# Patient Record
Sex: FEMALE | Race: WHITE | NOT HISPANIC OR LATINO | Employment: STUDENT | URBAN - METROPOLITAN AREA
[De-identification: names, ages, dates, MRNs, and addresses within clinical notes are randomized per-mention and may not be internally consistent; named-entity substitution may affect disease eponyms.]

---

## 2023-10-26 ENCOUNTER — TELEPHONE (OUTPATIENT)
Dept: SURGERY | Facility: CLINIC | Age: 18
End: 2023-10-26
Payer: COMMERCIAL

## 2023-12-27 ENCOUNTER — OFFICE VISIT (OUTPATIENT)
Dept: PRIMARY CARE | Facility: CLINIC | Age: 18
End: 2023-12-27
Payer: COMMERCIAL

## 2023-12-27 VITALS
SYSTOLIC BLOOD PRESSURE: 106 MMHG | OXYGEN SATURATION: 100 % | WEIGHT: 135 LBS | HEIGHT: 63 IN | BODY MASS INDEX: 23.92 KG/M2 | DIASTOLIC BLOOD PRESSURE: 67 MMHG | HEART RATE: 97 BPM

## 2023-12-27 DIAGNOSIS — Z23 ENCOUNTER FOR IMMUNIZATION: ICD-10-CM

## 2023-12-27 DIAGNOSIS — N94.3 PMS (PREMENSTRUAL SYNDROME): Primary | ICD-10-CM

## 2023-12-27 PROBLEM — K58.9 IRRITABLE BOWEL SYNDROME: Status: ACTIVE | Noted: 2023-12-27

## 2023-12-27 PROBLEM — K44.9 HIATAL HERNIA: Status: ACTIVE | Noted: 2023-12-27

## 2023-12-27 PROBLEM — L70.9 ACNE: Status: ACTIVE | Noted: 2023-12-27

## 2023-12-27 PROBLEM — K21.9 ACID REFLUX: Status: ACTIVE | Noted: 2023-12-27

## 2023-12-27 PROCEDURE — 1036F TOBACCO NON-USER: CPT | Performed by: FAMILY MEDICINE

## 2023-12-27 PROCEDURE — 99213 OFFICE O/P EST LOW 20 MIN: CPT | Performed by: FAMILY MEDICINE

## 2023-12-27 PROCEDURE — 90471 IMMUNIZATION ADMIN: CPT | Performed by: FAMILY MEDICINE

## 2023-12-27 PROCEDURE — 90686 IIV4 VACC NO PRSV 0.5 ML IM: CPT | Performed by: FAMILY MEDICINE

## 2023-12-27 RX ORDER — NORGESTIMATE AND ETHINYL ESTRADIOL 0.25-0.035
1 KIT ORAL DAILY
Qty: 84 TABLET | Refills: 3 | Status: SHIPPED | OUTPATIENT
Start: 2023-12-27 | End: 2024-12-26

## 2023-12-27 RX ORDER — FLUTICASONE PROPIONATE 50 MCG
SPRAY, SUSPENSION (ML) NASAL
COMMUNITY
Start: 2021-07-30

## 2023-12-27 RX ORDER — ADAPALENE AND BENZOYL PEROXIDE GEL, 0.1%/2.5% 1; 25 MG/G; MG/G
GEL TOPICAL
COMMUNITY

## 2023-12-27 RX ORDER — OMEPRAZOLE 40 MG/1
40 CAPSULE, DELAYED RELEASE ORAL DAILY
COMMUNITY
End: 2024-03-27

## 2023-12-27 ASSESSMENT — ENCOUNTER SYMPTOMS
OCCASIONAL FEELINGS OF UNSTEADINESS: 0
LOSS OF SENSATION IN FEET: 0
DEPRESSION: 0

## 2023-12-27 ASSESSMENT — PATIENT HEALTH QUESTIONNAIRE - PHQ9
1. LITTLE INTEREST OR PLEASURE IN DOING THINGS: NOT AT ALL
SUM OF ALL RESPONSES TO PHQ9 QUESTIONS 1 AND 2: 0
2. FEELING DOWN, DEPRESSED OR HOPELESS: NOT AT ALL

## 2023-12-27 NOTE — PROGRESS NOTES
"Subjective   Patient ID: Sangeeta Padron is a 18 y.o. female who presents for Menstrual Problem (Cramps and Mental Problems).    HPI   The patient reports that her mood is frequently affected by her menstrual cycle and it is concerning to her. She has been on oral contraceptive pills in the past and noticed that once she stopped taking it her PMS symptoms worsened.    Review of Systems  Constitutional: No fever or chills  Cardiovascular: no chest pain, no palpitations and no syncope.   Respiratory: no cough, no shortness of breath during exertion and no shortness of breath at rest.   Gastrointestinal: no abdominal pain, no nausea and no vomiting.  Neuro: No Headache, no dizziness    Objective   /67   Pulse 97   Ht 1.6 m (5' 3\")   Wt 61.2 kg (135 lb)   SpO2 100%   BMI 23.91 kg/m²     Physical Exam  Constitutional: Alert and in no acute distress. Well developed, well nourished  Head and Face: Head and face: Normal.    Cardiovascular: Heart rate and rhythm were normal, normal S1 and S2. No peripheral edema.   Pulmonary: No respiratory distress. Clear bilateral breath sounds.  Musculoskeletal: Gait and station: Normal. Muscle strength/tone: Normal.   Skin: Normal skin color and pigmentation, normal skin turgor, and no rash.    Psychiatric: Judgment and insight: Intact. Mood and affect: Normal.    Lab Results   Component Value Date    WBC 5.5 07/08/2022    HGB 13.0 07/08/2022    HCT 40.1 07/08/2022     07/08/2022    CHOL 146 07/08/2022    TRIG 63 07/08/2022    HDL 55.6 07/08/2022    ALT 9 07/08/2022    AST 20 07/08/2022     07/08/2022    K 4.2 07/08/2022     07/08/2022    CREATININE 0.72 07/08/2022    BUN 9 07/08/2022    CO2 24 07/08/2022    TSH 1.62 07/30/2021       EGD  Patient Name: Sangeeta Padron  Procedure Date: 10/14/2022 11:04 AM  MRN: [id number]  Account Number: [id number]  YOB: 2005  Admit Type: Outpatient  Site: 10  Ethnicity: Not  or   Race: " White  Attending MD: Angelika Kelley MD, [id number]  Referring MD:            Procedure:             Upper GI endoscopy  Indications:           Heartburn  Providers:             Angelika Kelley MD (Doctor)  Medicines:             Monitored Anesthesia Care  Complications:         No immediate complications. Estimated blood loss:                          Minimal.  Procedure:             Pre-Anesthesia Assessment:                         - Prior to the procedure, a History and Physical was                          performed, and patient medications and allergies were                          reviewed. The patient is competent. The risks and                          benefits of the procedure and the sedation options and                          risks were discussed with the patient. All questions                          were answered and informed consent was obtained.                          Patient identification and proposed procedure were                          verified by the physician and the nurse in the                          procedure room. Mental Status Examination: alert and                          oriented. Airway Examination: normal oropharyngeal                          airway and neck mobility. Respiratory Examination:                          clear to auscultation. CV Examination: normal.                          Prophylactic Antibiotics: The patient does not require                          prophylactic antibiotics. Prior Anticoagulants: The                          patient has taken no anticoagulant or antiplatelet                          agents. ASA Grade Assessment: II - A patient with mild                          systemic disease. After reviewing the risks and                          benefits, the patient was deemed in satisfactory                          condition to undergo the procedure. The anesthesia                          plan was to use monitored anesthesia care (MAC).                           Immediately prior to administration of medications,                          the patient was re-assessed for adequacy to receive                          sedatives. The heart rate, respiratory rate, oxygen                          saturations, blood pressure, adequacy of pulmonary                          ventilation, and response to care were monitored                          throughout the procedure. The physical status of the                          patient was re-assessed after the procedure.                         After obtaining informed consent, and time out was                          performed, the endoscope was passed under direct                          vision. Throughout the procedure, the patient's blood                          pressure, pulse, and oxygen saturations were monitored                          continuously. The Diagnostic Upper Endoscope was                          introduced through the mouth, and advanced to the                          third part of duodenum. The upper GI endoscopy was                          accomplished without difficulty. The patient tolerated                          the procedure well.  Moderate Sedation:       An independent trained observer was present and continuously monitored        the patient.  Estimated Blood Loss:       Estimated blood loss was minimal.  Findings:       The Z-line was regular and was found 38 cm from the incisors.       The gastroesophageal flap valve was visualized endoscopically and        classified as Hill Grade II (fold present, opens with respiration).        Oatulous and blows open easily       The entire examined stomach was normal.       The examined duodenum was normal.       The BRAVO capsule with delivery system was introduced through the mouth        and advanced into the esophagus, such that the BRAVO pH capsule was        positioned 32 cm from the incisors, which was 6 cm proximal to the GE         junction. Suction was applied to the well of the BRAVO pH capsule to        suck in the adjacent mucosa of the esophagus using the external vacuum        pump set at a minimum vacuum pressure of 550 mmHg for 45 seconds. The        BRAVO pH capsule was then deployed by depressing the plunger on top of        the handle to advance the locking pin into the mucosa, thereby attaching        the capsule to the esophagus. The plunger was then rotated a quarter        turn clockwise to release the capsule from the delivery system. The        delivery system was then withdrawn. Endoscopy was utilized for probe        placement and diagnostic evaluation. The scope was reinserted to        evaluate placement of the BRAVO capsule. Visualization showed the BRAVO        capsule to be in an appropriate position.  Impression:            - Z-line regular, 38 cm from the incisors.                         - Gastroesophageal flap valve classified as Hill Grade                          II (fold present, opens with respiration).                         - Normal stomach.                         - Normal examined duodenum.                         - The BRAVO pH capsule was positioned 32 cm from the                          incisors, which was 6 cm proximal to the GE junction.                         - No specimens collected.  Recommendation:        - Discharge patient to home (ambulatory).                         - Resume previous diet indefinitely.                         - Continue present medications after the bravo box is                          turned in                         - Return to my office after studies are complete.  Procedure Code(s):     --- Professional ---                         07750, Esophagogastroduodenoscopy, flexible,                          transoral; diagnostic, including collection of                          specimen(s) by brushing or washing, when performed                          (separate  procedure)  Diagnosis Code(s):     --- Professional ---                         R12, Heartburn  CPT copyright 2021 American Medical Association. All rights reserved.  The codes documented in this report are preliminary and upon  review may   be revised to meet current compliance requirements.  MD Angelika Teague MD  10/14/2022 11:53:19 AM  This report has been signed electronically.  Number of Addenda: 0      Assessment/Plan   Diagnoses and all orders for this visit:  PMS (premenstrual syndrome)  -     norgestimate-ethinyl estradioL (Sprintec, 28,) 0.25-35 mg-mcg tablet; Take 1 tablet by mouth once daily.  Encounter for immunization  -     Flu vaccine (IIV4) age 6 months and greater, preservative free        Dear Sangeeta Padron     It was my pleasure to take care of you today in the office. Below are the things we discussed today:    1. PMS: Start Sprintec. Advised the patient that if she has any issues with this medication please let me know.    2. Flu shot: Given today.    Your yearly Physical is due in: July 2024   When you call the office for your yearly Physical, please ask them to inform me to order your blood work, so that you can get the fasting blood work before your appointment and we can discuss the results at your physical.      Please call me if any questions arise from now until your next visit. I will call you after I am done seeing patients. A Doctor is always available by phone when the office is closed. Please feel free to call for help with any problem that you feel shouldn't wait until the office re-opens.     Scribe Attestation  By signing my name below, IAlice Scribe   attest that this documentation has been prepared under the direction and in the presence of Toya Ryan MD.

## 2024-01-30 ENCOUNTER — TELEPHONE (OUTPATIENT)
Dept: SURGERY | Facility: CLINIC | Age: 19
End: 2024-01-30
Payer: COMMERCIAL

## 2024-01-30 NOTE — PROGRESS NOTES
GENERAL SURGERY CLINIC  FOLLOW UP NOTE    CLINIC DATE: 2/5/24    NAME:  Sangeeta Padron 18 y.o.  MRN: [id number]    THIS VISIT WEIGHT / BMI:   Wt Readings from Last 1 Encounters:   12/27/23 61.2 kg (135 lb)      BMI Readings from Last 1 Encounters:   12/27/23 23.91 kg/m²     WEIGHT / BMI HX:    Wt Readings from Last 3 Encounters:   12/27/23 61.2 kg (135 lb)   09/12/22 55.8 kg (123 lb) (42 %, Z= -0.20)*   07/06/22 55.3 kg (122 lb) (41 %, Z= -0.23)*     * Growth percentiles are based on CDC (Girls, 2-20 Years) data.       BMI Readings from Last 3 Encounters:   12/27/23 23.91 kg/m²   09/12/22 21.79 kg/m² (52 %, Z= 0.06)*   07/06/22 21.27 kg/m² (46 %, Z= -0.09)*     * Growth percentiles are based on CDC (Girls, 2-20 Years) data.       SURGEON:  Dr. 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 . She has had workup with some tests at [employer] and some at . Endoscopy shows normal anatomy. Esophagram shows abnormal motility, manometry demonstrates a sliding hiatal hernia with a hypotensive LES and poor esophageal clearance. She is here with her parents today. She does have pathologic reflux on the bravo with reflux 5/3%. She has a small sliding hiatal hernia. We discussed she would be a great candidate for TIF and geve a 70% success rate. We discussed with surgery can address the HH and a fundoplication to strengthen the hypotensive valve. she notes her symptoms are manageable for the moment. We discussed the poor symtpoms association on the bravo and that some symtpoms are related to reflux and some may be related to her menses. Now 6 months later she notes the following:cough about the same. Notes more hiccups when she eat. Abdominal pain less often and now notes the menses seem like a separate thing. We will continue to monitor and see her in 6 months.       Chew well  Eat slowly  Take small  bites  We can refill the omeprazole  Don';t overeat  Follow up in 6 months  Avoid chocolate, peppermint and caffeine.     egd by Sergio: normal anatomy, no HH  EMOT 12/21 shows poor clearance with bolus transit 50%, hypotensive LES and a sliding hiatal herniw  esophagram at [employer] shows abnormal motility with stripping wave  MBS-normal     Dr López Note May 2022  Ms. Padron is a 16-year-old female with a hiatal hernia and reflux disease with main symptom of coughing after consuming liquids. This raises concern for possible silent aspiration which could be a long-term problem for her over the years. I reviewed the studies submitted by [facility] and also the recent esophageal manometry. An argument could be made for surgical correction of her hiatal hernia. We discussed technique of laparoscopic or robotic assisted hiatal hernia repair with different kinds of fundoplication. Risks of surgery were also reviewed. Specific risks including bleeding, infection, recurrence, dysphagia etc. were addressed. Patient and her mother are leaning toward surgery. All other questions were answered. I will reach out to [facility] to see if they have plans to perform an upper endoscopy prior to surgery.     Carlos note Novemebr 2021  Esophageal dysmotility- you have not seen a difference with the famotidine and have stopped this. I would recommend having a esophageal manometry to assess for a disorder called achalasia.     In the interim please continue to cut your food up really well, chew well, drink liquid between each bits    I will call you with the results and determine follow up     Carlos Note July 2021  Increased reflux and laryngeal dysphagia- I will order an swallowing evaluation with speech therapy and an esophagram to check the esophagus. We will complete a breath test for H-pylori. You can use the famotidine 20 mg twice daily.    Abdominal cramping and loose stools- your symptoms are more consistent with Irritable  bowel syndrome. I will order labs for celiac and thyroid to rule out other issues.    I will call you with the results and determine follow up       PRESENTING TODAY for General Surgery [facility]UV (no surgery to date):   is a 18 y.o. female.  Self verbalizes concerns: Stomach symptoms increasing even while on medications, throat is now hurting at night.      CURRENT SYMPTOMS:      Abdominal pain:  Yes          Abdominal bloating:  Yes         Burping:  No          Constipation: Yes    Diarrhea: Yes    Experiencing hunger: No    Food Intolerances: No    Food Sticking:  No    Gassy:  Yes    Hiccups: Yes          Hx Gastric Ulcers:  No         Nausea/vomiting: No    Symptoms Affect Ability to Excercise:No             DIET HISTORY:       Carbonated Beverages: Yes          Caffeinated Beverages: Yes    Fluid intake: feels hydrated oz/day         GERD - Health Related Quality of Life Questionnaire (GERD- HRQL)  On PPI    How bad is the heartburn? 0 = No symptoms  Heartburn when lying down? 0 = No symptoms  Heartburn when standing up? 0 = No symptoms  Heartburn after meals? 0 = No symptoms  Does heartburn change your diet? 0 = No symptoms  Does heartburn wake you from sleep? 0 = No symptoms    Do you have difficulty swallowing? 0 = No symptoms  Do you have pain with swallowing? 0 = No symptoms  If you take medication, does this affect your daily life? 1 = Symptoms noticeable but not bothersome    How bad is the regurgitation? 0 = No symptoms  Regurgitation when lying down? 0 = No symptoms  Regurgitation when standing up? 0 = No symptoms  Regurgitation after meals? 0 = No symptoms  Does regurgitation change your diet? 0 = No symptoms  Does regurgitation wake you from sleep? 0 = No symptoms    How satisfied are you with your present condition? Satisfied    Total score (calculated by summing the individual scores of questions 1-15): 0   Greatest possible score 75 (worst symptoms).   Lowest possible score 0 (no  symptoms).    Heartburn score (calculated by summing the individual scores of questions 1-6): 0   Worst heartburn symptoms: 30.   No heartburn symptoms: 0.   Score less than or equal to 12 with each individual question not exceeding 2 indicate heartburn elimination.    Regurgitation score (calculated by summing the individual scores of questions 10-15): 0   Worst regurgitation symptoms: 30.   No regurgitation symptoms: 0.   Score less than or equal to 12 with each individual question not exceeding 2 indicate regurgitation elimination.     CURRENT MEDICATIONS:  Current Outpatient Medications   Medication Sig Dispense Refill    adapalene-benzoyl peroxide 0.1-2.5 % gel APPLY THIN LAYER TO FACE NIGHTLY      fluticasone (Flonase Allergy Relief) 50 mcg/actuation nasal spray Administer into affected nostril(s).      norgestimate-ethinyl estradioL (Sprintec, 28,) 0.25-35 mg-mcg tablet Take 1 tablet by mouth once daily. 84 tablet 3    omeprazole (PriLOSEC) 40 mg DR capsule Take 1 capsule (40 mg) by mouth once daily.       No current facility-administered medications for this visit.       PAST MEDICAL HISTORY:  Patient Active Problem List   Diagnosis    Acid reflux    Hiatal hernia    Acne    Irritable bowel syndrome       PAST SURGICAL HISTORY:  Past Surgical History:   Procedure Laterality Date    OTHER SURGICAL HISTORY  08/20/2018    Extraction Impacted Root       FAMILY HISTORY:  No family history on file.    SOCIAL HISTORY:  Social History     Socioeconomic History    Marital status: Single     Spouse name: Not on file    Number of children: Not on file    Years of education: Not on file    Highest education level: Not on file   Occupational History    Not on file   Tobacco Use    Smoking status: Never    Smokeless tobacco: Never   Substance and Sexual Activity    Alcohol use: Not on file    Drug use: Not on file    Sexual activity: Not on file   Other Topics Concern    Not on file   Social History Narrative    Not on  file     Social Determinants of Health     Financial Resource Strain: Not on file   Food Insecurity: Not on file   Transportation Needs: Not on file   Physical Activity: Not on file   Stress: Not on file   Social Connections: Not on file   Intimate Partner Violence: Not on file   Housing Stability: Not on file       ALLERGIES:  No Known Allergies    REVIEW OF SYSTEMS:  CONSTITUTIONAL: Patient denies fevers, chills, sweats and weight changes.  EYES: Patient denies any visual symptoms.  EARS, NOSE, AND THROAT: No difficulties with hearing. No symptoms of rhinitis or sore throat.  CARDIOVASCULAR: Patient denies chest pains, palpitations, orthopnea and paroxysmal nocturnal dyspnea.  RESPIRATORY: No dyspnea on exertion, no wheezing or cough.  GI: No nausea, vomiting, diarrhea, constipation, abdominal pain, hematochezia or melena.  : No urinary hesitancy or dribbling. No nocturia or urinary frequency. No abnormal urethral discharge.  MUSCULOSKELETAL: No myalgias or arthralgias.  NEUROLOGIC: No chronic headaches, no seizures. Patient denies numbness, tingling or weakness.  PSYCHIATRIC: Patient denies problems with mood disturbance. No problems with anxiety.  ENDOCRINE: No excessive urination or excessive thirst.  DERMATOLOGIC: Patient denies any rashes or skin changes.    PHYSICAL EXAM:  There were no vitals taken for this visit.  PHYSICAL EXAMINATION:  GENERAL: No apparent distress. Pt is alert and oriented x3.  VITAL SIGNS: HR, BP, Temp; Normal  HEENT: Head is normocephalic and atraumatic. Extraocular muscles are intact. Pupils are equal, round, and reactive to light and accommodation. Nares appeared normal. Mouth is well hydrated and without lesions. Mucous membranes are moist. Posterior pharynx clear of any exudate or lesions.  NECK: Supple. No carotid bruits. No lymphadenopathy or thyromegaly.  LUNGS: Clear to auscultation.  HEART: Regular rate and rhythm without murmur.  ABDOMEN: Soft, appropriately mildly tender  to palpation, and nondistended. Positive bowel sounds. No hepatosplenomegaly was noted. Incisions CDI.  EXTREMITIES: Without any cyanosis, clubbing, rash, lesions or edema.  NEUROLOGIC: Cranial nerves II through XII are grossly intact.  PSYCHIATRIC: Flat affect, but denies suicidal or homicidal ideations.  SKIN: No ulceration or induration present.      IMPRESSION:   Sangeeta Padron IS A 18 y.o. female with ***    PLAN:  ***    The risks of the procedure including bleeding, infection, injury to neighboring organ, prolonged hospitalization, need for further procedure and death have been explained to the patient and Sangeeta Padron has expressed understanding and acceptance of them. Consent has been signed.    *** minutes spent with patient on face-to-face interaction, history/documentation, education, and coordination of care.    Dr. Angelika Kelley M.D., MPH  Director of Bariatric & Minimally Invasive Surgery  [place] [address]  T:  [phone]  F:  [phone]     Nila Hopper, RN Assistant Nurse Manager, Care Coordinator - Bariatric/General Surgery [facility] Patient Nursing Contact  T:  [phone]  F:  [phone]

## 2024-01-30 NOTE — TELEPHONE ENCOUNTER
Thank you for speaking with me earlier today & confirming your scheduled visit with Dr. Kelley on 2/5/24 at 12:45 PM at [facility] (inside [facility], main floor in the [facility]).  We look forward to seeing you, Brandy Reyna, [facility] Clinic Nurse.

## 2024-02-05 ENCOUNTER — APPOINTMENT (OUTPATIENT)
Dept: SURGERY | Facility: CLINIC | Age: 19
End: 2024-02-05
Payer: COMMERCIAL

## 2024-03-11 ENCOUNTER — APPOINTMENT (OUTPATIENT)
Dept: SURGERY | Facility: CLINIC | Age: 19
End: 2024-03-11
Payer: COMMERCIAL

## 2024-03-13 ENCOUNTER — APPOINTMENT (OUTPATIENT)
Dept: GASTROENTEROLOGY | Facility: CLINIC | Age: 19
End: 2024-03-13
Payer: COMMERCIAL

## 2024-03-21 ENCOUNTER — TELEPHONE (OUTPATIENT)
Dept: SURGERY | Facility: CLINIC | Age: 19
End: 2024-03-21
Payer: COMMERCIAL

## 2024-03-21 NOTE — PROGRESS NOTES
GENERAL SURGERY CLINIC  FOLLOW UP NOTE    CLINIC DATE: 4/1/24    NAME:  Sangeeta Padron 18 y.o.  MRN: [id number]    THIS VISIT WEIGHT / BMI: 140 LBS/24.80  Wt Readings from Last 1 Encounters:   12/27/23 61.2 kg (135 lb)      BMI Readings from Last 1 Encounters:   12/27/23 23.91 kg/m²     WEIGHT / BMI HX:    Wt Readings from Last 3 Encounters:   12/27/23 61.2 kg (135 lb)   09/12/22 55.8 kg (123 lb) (42 %, Z= -0.20)*   07/06/22 55.3 kg (122 lb) (41 %, Z= -0.23)*     * Growth percentiles are based on CDC (Girls, 2-20 Years) data.       BMI Readings from Last 3 Encounters:   12/27/23 23.91 kg/m²   09/12/22 21.79 kg/m² (52 %, Z= 0.06)*   07/06/22 21.27 kg/m² (46 %, Z= -0.09)*     * Growth percentiles are based on CDC (Girls, 2-20 Years) data.       SURGEON:  Dr. Angelika Kelley      PROVIDER LAST IMPRESSION VISIT NOTE:  5/31/23 -     Patient Discussion/Summary  Chew well  Eat slowly  Take small bites  We can refill the omeprazole  Don';t overeat  Follow up in 6 months  Avoid chocolate, peppermint and caffeine.      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 . She has had workup with some tests at [employer] and some at . Endoscopy shows normal anatomy. Esophagram shows abnormal motility, manometry demonstrates a sliding hiatal hernia with a hypotensive LES and poor esophageal clearance. She is here with her parents today. She does have pathologic reflux on the bravo with reflux 5/3%. She has a small sliding hiatal hernia. We discussed she would be a great candidate for TIF and geve a 70% success rate. We discussed with surgery can address the HH and a fundoplication to strengthen the hypotensive valve. she notes her symptoms are manageable for the moment. We discussed the poor symtpoms association on the bravo and that some symtpoms are related to reflux and some may be related to her menses. Now 6 months later she notes the following:cough  about the same. Notes more hiccups when she eat. Abdominal pain less often and now notes the menses seem like a separate thing. We will continue to monitor and see her in 6 months.       PRESENTING TODAY for General Surgery [facility]UV (no surgery to date):   is a 18 y.o. female.  Self verbalizes concerns: Taking medications per last visit but symptoms are returning, Dentist recently told her back teeth show signs of erosion, Coughing mainly after drinking fluids.  Notes symtpms worsening.   She is using prescription toothpaste.  Fluoride supplemented.  She no longer thinks related to her cycle. On medicine still has symptoms, taking 40 q day. Symtpms are stomach pain, nausea, coughing when drinking and sore thorat.  Pain is more in lower abdomen.  Sometimes better with a bm, other times not. No gasx.  Feels not as much constipation.  Has more diarrhea.  Just kind of an achy pain.  Bmi's once/day and loose.  Can't tell if diet affects her bm  Yesterday had peas, potato soup, pastry, chocolate, did not feel to bad yesterday.  Symtpms not affecting her ability.  Notes after she eats, she gets the stomach pain about 10 min after eating. Sometimes diarrhea.  Feels more about the amount she eats. Mostly vegetarian.   CURRENT SYMPTOMS:      Abdominal pain:  Yes          Abdominal bloating:  No         Burping:  No          Constipation: Yes    Diarrhea: Yes    Experiencing hunger: No    Food Intolerances: No    Gassy:  Yes    Hiccups: Yes          Hx Gastric Ulcers:  No         Nausea/vomiting: No    Symptoms Affect Ability to Excercise:No         DIET HISTORY:       Carbonated Beverages: Yes          Caffeinated Beverages: Yes    Fluid intake: feels hydrated oz/day         GERD - Health Related Quality of Life Questionnaire (GERD- HRQL)  On PPI    How bad is the heartburn? 0 = No symptoms  Heartburn when lying down? 0 = No symptoms  Heartburn when standing up? 0 = No symptoms  Heartburn after meals? 0 = No symptoms  Does  heartburn change your diet? 0 = No symptoms  Does heartburn wake you from sleep? 0 = No symptoms    Do you have difficulty swallowing? 2 = Symptoms noticeable and bothersome but not every day  Do you have pain with swallowing? 0 = No symptoms  If you take medication, does this affect your daily life? 1 = Symptoms noticeable but not bothersome    How bad is the regurgitation? 0 = No symptoms  Regurgitation when lying down? 0 = No symptoms  Regurgitation when standing up? 0 = No symptoms  Regurgitation after meals? 0 = No symptoms  Does regurgitation change your diet? 0 = No symptoms  Does regurgitation wake you from sleep? 0 = No symptoms    How satisfied are you with your present condition? Neutral    Total score (calculated by summing the individual scores of questions 1-15): 3   Greatest possible score 75 (worst symptoms).   Lowest possible score 0 (no symptoms).    Heartburn score (calculated by summing the individual scores of questions 1-6): 0   Worst heartburn symptoms: 30.   No heartburn symptoms: 0.   Score less than or equal to 12 with each individual question not exceeding 2 indicate heartburn elimination.    Regurgitation score (calculated by summing the individual scores of questions 10-15): 0   Worst regurgitation symptoms: 30.   No regurgitation symptoms: 0.   Score less than or equal to 12 with each individual question not exceeding 2 indicate regurgitation elimination.     CURRENT MEDICATIONS:  Current Outpatient Medications   Medication Sig Dispense Refill    adapalene-benzoyl peroxide 0.1-2.5 % gel APPLY THIN LAYER TO FACE NIGHTLY      fluticasone (Flonase Allergy Relief) 50 mcg/actuation nasal spray Administer into affected nostril(s).      norgestimate-ethinyl estradioL (Sprintec, 28,) 0.25-35 mg-mcg tablet Take 1 tablet by mouth once daily. 84 tablet 3    omeprazole (PriLOSEC) 40 mg DR capsule Take 1 capsule (40 mg) by mouth once daily.       No current facility-administered medications for this  visit.       PAST MEDICAL HISTORY:  Patient Active Problem List   Diagnosis    Acid reflux    Hiatal hernia    Acne    Irritable bowel syndrome       PAST SURGICAL HISTORY:  Past Surgical History:   Procedure Laterality Date    OTHER SURGICAL HISTORY  08/20/2018    Extraction Impacted Root       FAMILY HISTORY:  No family history on file.    SOCIAL HISTORY:  Social History     Socioeconomic History    Marital status: Single     Spouse name: Not on file    Number of children: Not on file    Years of education: Not on file    Highest education level: Not on file   Occupational History    Not on file   Tobacco Use    Smoking status: Never    Smokeless tobacco: Never   Substance and Sexual Activity    Alcohol use: Not on file    Drug use: Not on file    Sexual activity: Not on file   Other Topics Concern    Not on file   Social History Narrative    Not on file     Social Determinants of Health     Financial Resource Strain: Not on file   Food Insecurity: Not on file   Transportation Needs: Not on file   Physical Activity: Not on file   Stress: Not on file   Social Connections: Not on file   Intimate Partner Violence: Not on file   Housing Stability: Not on file       ALLERGIES:  No Known Allergies    REVIEW OF SYSTEMS:  CONSTITUTIONAL: Patient denies fevers, chills, sweats and weight changes.  EYES: Patient denies any visual symptoms.  EARS, NOSE, AND THROAT: No difficulties with hearing. No symptoms of rhinitis or sore throat.  CARDIOVASCULAR: Patient denies chest pains, palpitations, orthopnea and paroxysmal nocturnal dyspnea.  RESPIRATORY: No dyspnea on exertion, no wheezing or cough.  GI: No nausea, vomiting, diarrhea, constipation, abdominal pain, hematochezia or melena.  : No urinary hesitancy or dribbling. No nocturia or urinary frequency. No abnormal urethral discharge.  MUSCULOSKELETAL: No myalgias or arthralgias.  NEUROLOGIC: No chronic headaches, no seizures. Patient denies numbness, tingling or  weakness.  PSYCHIATRIC: Patient denies problems with mood disturbance. No problems with anxiety.  ENDOCRINE: No excessive urination or excessive thirst.  DERMATOLOGIC: Patient denies any rashes or skin changes.    PHYSICAL EXAM:  There were no vitals taken for this visit.  PHYSICAL EXAMINATION:  GENERAL: No apparent distress. Pt is alert and oriented x3.  VITAL SIGNS: HR, BP, Temp; Normal  HEENT: Head is normocephalic and atraumatic. Extraocular muscles are intact. Pupils are equal, round, and reactive to light and accommodation. Nares appeared normal. Mouth is well hydrated and without lesions. Mucous membranes are moist. Posterior pharynx clear of any exudate or lesions.  NECK: Supple. No carotid bruits. No lymphadenopathy or thyromegaly.  LUNGS: Clear to auscultation.  HEART: Regular rate and rhythm without murmur.  ABDOMEN: Soft, appropriately mildly tender to palpation, and nondistended. Positive bowel sounds. No hepatosplenomegaly was noted. Incisions CDI.  EXTREMITIES: Without any cyanosis, clubbing, rash, lesions or edema.  NEUROLOGIC: Cranial nerves II through XII are grossly intact.  PSYCHIATRIC: Flat affect, but denies suicidal or homicidal ideations.  SKIN: No ulceration or induration present.      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 . She has had workup with some tests at [employer] and some at . Endoscopy shows normal anatomy. Esophagram shows abnormal motility, manometry demonstrates a sliding hiatal hernia with a hypotensive LES and poor esophageal clearance. She is here with her parents today. She does have pathologic reflux on the bravo with reflux 5/3%. She has a small sliding hiatal hernia. We discussed she would be a great candidate for TIF and geve a 70% success rate. We discussed with surgery can address the HH and a fundoplication to strengthen the hypotensive valve. she notes her symptoms are  manageable for the moment. We discussed the poor symtpoms association on the bravo and that some symtpoms are related to reflux and some may be related to her menses. Now 6 months later she notes the following:cough about the same. Notes more hiccups when she eat. Abdominal pain less often and now notes the menses seem like a separate thing. We will continue to monitor and see her in 6 months.     Today she confirms that she still has some pain and some heartburn.  However she does confirm that they are not related to her menstrual cycle.  The PPIs do reasonably control her reflux but she does have significant breakthrough symptoms.  Is interesting is that she did talk more about some lower abdominal pain and her diarrhea today.  It is not necessarily related to food.  But this seems to be separate from recurrent heartburn.  Plan for mom to keep a food diary and see if she can associate some of the lower abdominal pain and the diarrhea with different types of foods.  As we discussed that if we wanted to do an antireflux procedure by deciliter abdominal pain and diarrhea it is unlikely to be affected by that.  Will also evaluate her gallbladder with right upper quadrant ultrasound as they can be a cause of some of her lower abdominal symptoms.  We will see her back in 4 months time at which point we will reevaluate.  At that point we will likely discuss intervention for her reflux and hopefully have a better understanding of her diarrhea.  The patient and her mother are comfortable with this plan    PLAN:  Keep a food diary  Monitor to see if artifical sweeteners, gluten or fatty foods affect your diarrhea and lower abdominal pain  Get an ultrasound of the gallbladder  See me in 4 months  Continue te antiacid meds     for now.   Avoid trigger foods  Do not oevereat.         Dr. Angelika Kelley M.D., MPH  Director of Bariatric & Minimally Invasive Surgery  [address]  [address]  T:  [phone]  F:  [phone]     Nila Hopper, RN Assistant Nurse Manager, Care Coordinator - Bariatric/General Surgery [facility] Patient Nursing Contact  T:  [phone]  F:  [phone]

## 2024-03-26 ENCOUNTER — TELEPHONE (OUTPATIENT)
Dept: SURGERY | Facility: CLINIC | Age: 19
End: 2024-03-26
Payer: COMMERCIAL

## 2024-03-26 NOTE — TELEPHONE ENCOUNTER
Xu Rutherford, just left a second message to confirm your scheduled visit with Dr. Kelley on 4/1/24 at [facility] (inside [facility], main floor in the Specialty Clinic) & to complete the usual pre-visit quetions.   Parking is available at a cost of $5.00 at the Main Entrance.   Please return my call at:  [phone] & We look forward to seeing you, Brandy Reyna, MEGAN Clinic Nurse.

## 2024-03-26 NOTE — TELEPHONE ENCOUNTER
Thank you for speaking with me earlier today & confirming your scheduled visit with Dr. Kelley on 4/1/24 8:15 AM at [facility] (inside [facility], main floor in the Specialty Clinic).   Parking is available at a cost of $5.00 at the Main Entrance.   We look forward to seeing you, Brandy Reyna, LPMALIKA Clinic Nurse.

## 2024-03-27 DIAGNOSIS — K21.9 GASTROESOPHAGEAL REFLUX DISEASE WITHOUT ESOPHAGITIS: Primary | ICD-10-CM

## 2024-03-27 RX ORDER — OMEPRAZOLE 40 MG/1
40 CAPSULE, DELAYED RELEASE ORAL DAILY
Qty: 90 CAPSULE | Refills: 2 | Status: SHIPPED | OUTPATIENT
Start: 2024-03-27

## 2024-04-01 ENCOUNTER — OFFICE VISIT (OUTPATIENT)
Dept: SURGERY | Facility: CLINIC | Age: 19
End: 2024-04-01
Payer: COMMERCIAL

## 2024-04-01 VITALS
SYSTOLIC BLOOD PRESSURE: 110 MMHG | BODY MASS INDEX: 24.8 KG/M2 | DIASTOLIC BLOOD PRESSURE: 71 MMHG | HEART RATE: 93 BPM | WEIGHT: 140 LBS

## 2024-04-01 DIAGNOSIS — R10.11 RIGHT UPPER QUADRANT ABDOMINAL PAIN: ICD-10-CM

## 2024-04-01 DIAGNOSIS — K21.9 GASTROESOPHAGEAL REFLUX DISEASE WITHOUT ESOPHAGITIS: Primary | ICD-10-CM

## 2024-04-01 PROCEDURE — 99214 OFFICE O/P EST MOD 30 MIN: CPT | Performed by: SURGERY

## 2024-04-01 RX ORDER — SODIUM FLUORIDE 6 MG/ML
PASTE, DENTIFRICE DENTAL DAILY
COMMUNITY

## 2024-04-03 ENCOUNTER — TELEPHONE (OUTPATIENT)
Dept: SURGERY | Facility: CLINIC | Age: 19
End: 2024-04-03
Payer: COMMERCIAL

## 2024-04-03 NOTE — TELEPHONE ENCOUNTER
Xu Rutherford, the Right Upper Quadrant Ultra Sound for Gallbladder order now in system per Dr. Kelley visit - please call [phone] to self schedule at any  facility.   Once this has been completed please call [phone] to schedule a follow-up visit for test results review with Dr. Kelley.  Thanks!  Brandy Reyna LPN Clinic Nurse

## 2024-04-03 NOTE — PATIENT INSTRUCTIONS
Keep a food diary  Monitor to see if artifical sweeteners, gluten or fatty foods affect your diarrhea and lower abdominal pain  Get an ultrasound of the gallbladder  See me in 4 months  Continue te antiacid meds     for now.   Avoid trigger foods  Do not oevereat.         Dr. Angelika Kelley M.D., MPH  Director of Bariatric & Minimally Invasive Surgery  [place] [address]  T:  [phone]  F:  [phone]     Nila Hopper, RN Assistant Nurse Manager, Care Coordinator - Bariatric/General Surgery [facility] Patient Nursing Contact  T:  [phone]  F:  [phone]

## 2024-05-02 ENCOUNTER — HOSPITAL ENCOUNTER (OUTPATIENT)
Dept: RADIOLOGY | Facility: CLINIC | Age: 19
Discharge: HOME | End: 2024-05-02
Payer: COMMERCIAL

## 2024-05-02 DIAGNOSIS — R10.11 RIGHT UPPER QUADRANT ABDOMINAL PAIN: ICD-10-CM

## 2024-05-02 PROCEDURE — 76705 ECHO EXAM OF ABDOMEN: CPT | Performed by: RADIOLOGY

## 2024-05-02 PROCEDURE — 76705 ECHO EXAM OF ABDOMEN: CPT

## 2024-05-20 ENCOUNTER — APPOINTMENT (OUTPATIENT)
Dept: SURGERY | Facility: CLINIC | Age: 19
End: 2024-05-20
Payer: COMMERCIAL

## 2024-05-28 ENCOUNTER — APPOINTMENT (OUTPATIENT)
Dept: SURGERY | Facility: CLINIC | Age: 19
End: 2024-05-28
Payer: COMMERCIAL

## 2024-06-04 ENCOUNTER — APPOINTMENT (OUTPATIENT)
Dept: SURGERY | Facility: CLINIC | Age: 19
End: 2024-06-04
Payer: COMMERCIAL

## 2024-07-08 ENCOUNTER — APPOINTMENT (OUTPATIENT)
Dept: SURGERY | Facility: CLINIC | Age: 19
End: 2024-07-08
Payer: COMMERCIAL

## 2024-07-10 ENCOUNTER — APPOINTMENT (OUTPATIENT)
Dept: SURGERY | Facility: CLINIC | Age: 19
End: 2024-07-10
Payer: COMMERCIAL

## 2024-07-16 NOTE — PROGRESS NOTES
Subjective   Patient ID: Sangeeta Padron is a 19 y.o. female who presents for Follow-up.    HPI   The patient states that her symptoms started 5 days ago with a sore throat and congestion. She her symptoms have improved over the past couple day, but not fully. She is on OCPs for acne vulgaris and is also taking omeprazole for GERD but she does not think her acid reflux is being controlled at this time.    Review of Systems:  Constitutional: No fever or chills  Cardiovascular: no chest pain, no palpitations and no syncope.   Respiratory: no cough, no shortness of breath during exertion and no shortness of breath at rest.   Gastrointestinal: no abdominal pain, no nausea and no vomiting.  Neuro: No Headache, no dizziness    Physical Exam:   Constitutional: Alert and in no acute distress. Well developed, well nourished.   Head and Face: Head and face: Normal.     Eyes: Normal external exam.    Ears, Nose, Mouth, and Throat: External inspection of ears and nose: Normal.  Hearing: Normal.   Neck: No neck mass was observed. Supple.  Pulmonary: No respiratory distress.    Musculoskeletal: Range of motion: Normal.     Skin: Normal skin color and pigmentation, normal skin turgor, and no rash.    Neurologic: Coordination: Normal.    Psychiatric: Judgment and insight: Intact. Mood and affect: Normal.    Lab Results   Component Value Date    WBC 5.5 07/08/2022    HGB 13.0 07/08/2022    HCT 40.1 07/08/2022     07/08/2022    CHOL 146 07/08/2022    TRIG 63 07/08/2022    HDL 55.6 07/08/2022    ALT 9 07/08/2022    AST 20 07/08/2022     07/08/2022    K 4.2 07/08/2022     07/08/2022    CREATININE 0.72 07/08/2022    BUN 9 07/08/2022    CO2 24 07/08/2022    TSH 1.62 07/30/2021       US gallbladder  Narrative: Interpreted By:  Mack Edwards,   STUDY:  US GALLBLADDER;  5/2/2024 9:19 am      INDICATION:  Signs/Symptoms:Rule out Gallstones as source of reported RUQ ABD  pain/diarrhea & After MD Exam..       COMPARISON:  None.      ACCESSION NUMBER(S):  [id number]      ORDERING CLINICIAN:  IVETH TEJADA      TECHNIQUE:  Multiple images of the right upper quadrant were obtained.      FINDINGS:  LIVER:  The liver measures 14.8 cm and is grossly unremarkable and free of  any focal lesions.          GALLBLADDER:  The gallbladder is nondistended, and demonstrates no evidence of  gallstones, wall thickening or surrounding fluid. The gallbladder  wall thickness is .16cm. Sonographic Alonso's sign is negative.          BILE DUCTS:  No evidence of intra or extrahepatic biliary dilatation is  identified; the common bile duct measures .16 cm.      PANCREAS:  The visualized pancreas is unremarkable in appearance.      RIGHT KIDNEY:  The right kidney measures 10.1 cm in length. The renal cortical  echogenicity and thickness are within normal limit.  No  hydronephrosis or renal calculi are seen.      Impression: Unremarkable ultrasound of the right upper quadrant.      MACRO:  None      Signed by: Mack Edwards 5/3/2024 6:56 AM  Dictation workstation:   [id number]      Assessment/Plan   Diagnoses and all orders for this visit:  Routine general medical examination at a health care facility  -     CBC; Future  -     Comprehensive Metabolic Panel; Future  -     Hemoglobin A1C; Future  -     Lipid Panel; Future  -     TSH with reflex to Free T4 if abnormal; Future  Vitamin D deficiency  -     Vitamin D 25-Hydroxy,Total (for eval of Vitamin D levels); Future  -     Vitamin B12; Future  Hiatal hernia  Acne vulgaris  PMS (premenstrual syndrome)  -     norgestimate-ethinyl estradioL (Sprintec, 28,) 0.25-35 mg-mcg tablet; Take 1 tablet by mouth once daily.  Gastroesophageal reflux disease without esophagitis  -     omeprazole (PriLOSEC) 40 mg DR capsule; Take 1 capsule (40 mg) by mouth once daily in the morning. Take before meals.      1. Acne: The patient is on OCPs.    2. GERD: Continue omeprazole. The patient will follow up with  surgery to see if they will operate on her hiatal hernia.    3. Blood work: Ordered.    This Medical recommendation has been made based on the Telephonic/Video conversation and the history is given by the patient, which I as a Physician and the patient also understand that there are limitations given the lack of an in-person Physical Exam. Patient will call back if symptoms don't improve.    Your yearly Physical is due in: July 2024  When you call the office for your yearly Physical, please ask them to inform me to order your blood work, so that you can get the fasting blood work before your appointment and we can discuss the results at your physical.      A Doctor is always available by phone when the office is closed. Please feel free to call for help with any problem that you feel shouldn't wait until the office re-opens.     Scribe Attestation  By signing my name below, Alice BOYER Scribe   attest that this documentation has been prepared under the direction and in the presence of Toya Ryan MD.

## 2024-07-17 ENCOUNTER — APPOINTMENT (OUTPATIENT)
Dept: PRIMARY CARE | Facility: CLINIC | Age: 19
End: 2024-07-17
Payer: COMMERCIAL

## 2024-07-17 DIAGNOSIS — N94.3 PMS (PREMENSTRUAL SYNDROME): ICD-10-CM

## 2024-07-17 DIAGNOSIS — Z00.00 ROUTINE GENERAL MEDICAL EXAMINATION AT A HEALTH CARE FACILITY: Primary | ICD-10-CM

## 2024-07-17 DIAGNOSIS — E55.9 VITAMIN D DEFICIENCY: ICD-10-CM

## 2024-07-17 DIAGNOSIS — L70.0 ACNE VULGARIS: ICD-10-CM

## 2024-07-17 DIAGNOSIS — K21.9 GASTROESOPHAGEAL REFLUX DISEASE WITHOUT ESOPHAGITIS: ICD-10-CM

## 2024-07-17 DIAGNOSIS — K44.9 HIATAL HERNIA: ICD-10-CM

## 2024-07-17 PROCEDURE — 1036F TOBACCO NON-USER: CPT | Performed by: FAMILY MEDICINE

## 2024-07-17 PROCEDURE — 99214 OFFICE O/P EST MOD 30 MIN: CPT | Performed by: FAMILY MEDICINE

## 2024-07-17 RX ORDER — OMEPRAZOLE 40 MG/1
40 CAPSULE, DELAYED RELEASE ORAL
Qty: 90 CAPSULE | Refills: 3 | Status: SHIPPED | OUTPATIENT
Start: 2024-07-17

## 2024-07-17 RX ORDER — NORGESTIMATE AND ETHINYL ESTRADIOL 0.25-0.035
1 KIT ORAL DAILY
Qty: 84 TABLET | Refills: 3 | Status: SHIPPED | OUTPATIENT
Start: 2024-07-17 | End: 2025-07-17

## 2024-07-18 ENCOUNTER — TELEPHONE (OUTPATIENT)
Dept: SURGERY | Facility: CLINIC | Age: 19
End: 2024-07-18
Payer: COMMERCIAL

## 2024-07-18 NOTE — PROGRESS NOTES
GENERAL SURGERY  POST DIAGNOTIC TESTING FOLLOW UP VISIT    CLINIC DATE:  7/22/24    NAME: Sangeeta Padron 19 y.o.  MRN: [id number]    THIS VISIT WEIGHT / BMI: 137 LBS/ BMI: 24.27  Wt Readings from Last 1 Encounters:   07/22/24 62.1 kg (137 lb)      BMI Readings from Last 1 Encounters:   07/22/24 24.27 kg/m²     WEIGHT / BMI TODAY:    Wt Readings from Last 3 Encounters:   07/22/24 62.1 kg (137 lb)   04/01/24 63.5 kg (140 lb)   12/27/23 61.2 kg (135 lb)      BMI Readings from Last 3 Encounters:   07/22/24 24.27 kg/m²   04/01/24 24.80 kg/m²   12/27/23 23.91 kg/m²       SURGEON:  Dr. YUMIKO Kelley     7/22:   Still taking omeprazole and it has helped. She kept a food diary. It helped some of her diarrhea and stomach aches especially when she avoids dairy (not milk, just cheese, icecream etc.) and fatty foods including meat. The cough stayed the same since the PPI. She did notice her reflux worsen at night and her throat hurting in the morning. So she is sleeping on wedge. she feels like her reflux is getting worse. Nevertheless she still feels her symptoms are manageable.  Nots reflux getting worse when she is sleeping.   Still on the ppi in am.  We have not tried a pepcid in the evening.  Been using the wedge and this is helpful. She notes overall feeling better as she is learning how to manage things.     PRESENTING FOR General Surgery Post Diagnostic Testing Results Review FUV: is a 19 y.o. female who has completed previously ordered RUQ US.      TEST RESULTS:    US gallbladder  Status: Final result     PACS Images     Show images for US gallbladder  Signed by    Signed Time Phone Pager   Mack Edwards MD 5/03/2024 06:56 [phone]     Exam Information    Status Exam Begun Exam Ended   Final 5/02/2024 08:50 5/02/2024 09:19     Study Result    Narrative & Impression   Interpreted By:  Mack Edwards,   STUDY:  US GALLBLADDER;  5/2/2024 9:19 am      INDICATION:  Signs/Symptoms:Rule out Gallstones as source of  reported RUQ ABD  pain/diarrhea & After MD Exam..      COMPARISON:  None.      ACCESSION NUMBER(S):  [id number]      ORDERING CLINICIAN:  IVETH TEJADA      TECHNIQUE:  Multiple images of the right upper quadrant were obtained.      FINDINGS:  LIVER:  The liver measures 14.8 cm and is grossly unremarkable and free of  any focal lesions.          GALLBLADDER:  The gallbladder is nondistended, and demonstrates no evidence of  gallstones, wall thickening or surrounding fluid. The gallbladder  wall thickness is .16cm. Sonographic Alonso's sign is negative.          BILE DUCTS:  No evidence of intra or extrahepatic biliary dilatation is  identified; the common bile duct measures .16 cm.      PANCREAS:  The visualized pancreas is unremarkable in appearance.      RIGHT KIDNEY:  The right kidney measures 10.1 cm in length. The renal cortical  echogenicity and thickness are within normal limit.  No  hydronephrosis or renal calculi are seen.      IMPRESSION:  Unremarkable ultrasound of the right upper quadrant.      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 . She has had workup with some tests at [employer] and some at . Endoscopy shows normal anatomy. Esophagram shows abnormal motility, manometry demonstrates a sliding hiatal hernia with a hypotensive LES and poor esophageal clearance. She is here with her parents today. She does have pathologic reflux on the bravo with reflux 5/3%. She has a small sliding hiatal hernia. We discussed she would be a great candidate for TIF and geve a 70% success rate. We discussed with surgery can address the HH and a fundoplication to strengthen the hypotensive valve. she notes  her symptoms are manageable for the moment. We discussed the poor symtpoms association on the bravo and that some symtpoms are related to reflux and some may be related to her menses. Now 6 months later she notes the following:cough about the same. Notes more hiccups when she eat. Abdominal pain less often and now notes the menses seem like a separate thing. We will continue to monitor and see her in 6 months.      Today she confirms that she still has some pain and some heartburn.  However she does confirm that they are not related to her menstrual cycle.  The PPIs do reasonably control her reflux but she does have significant breakthrough symptoms.  Is interesting is that she did talk more about some lower abdominal pain and her diarrhea today.  It is not necessarily related to food.  But this seems to be separate from recurrent heartburn.  Plan for mom to keep a food diary and see if she can associate some of the lower abdominal pain and the diarrhea with different types of foods.  As we discussed that if we wanted to do an antireflux procedure by deciliter abdominal pain and diarrhea it is unlikely to be affected by that.  Will also evaluate her gallbladder with right upper quadrant ultrasound as they can be a cause of some of her lower abdominal symptoms.  We will see her back in 4 months time at which point we will reevaluate.  At that point we will likely discuss intervention for her reflux and hopefully have a better understanding of her diarrhea.  The patient and her mother are comfortable with this plan     PLAN:  Keep a food diary  Monitor to see if artifical sweeteners, gluten or fatty foods affect your diarrhea and lower abdominal pain  Get an ultrasound of the gallbladder  See me in 4 months  Continue te antiacid meds    for now.   Avoid trigger foods  Do not oevereat.       GERD - Health Related Quality of Life Questionnaire (GERD- HRQL)  On PPI  Omeprazole Daily    How bad is the heartburn? 0 = No  "symptoms  Heartburn when lying down? 0 = No symptoms  Heartburn when standing up? 0 = No symptoms  Heartburn after meals? 0 = No symptoms  Does heartburn change your diet? 0 = No symptoms  Does heartburn wake you from sleep? 0 = No symptoms    Do you have difficulty swallowing? 3 = Symptoms bothersome every day  \"mostly with liquids\"  Do you have pain with swallowing? 0 = No symptoms  If you take medication, does this affect your daily life? 1 = Symptoms noticeable but not bothersome    How bad is the regurgitation? 2 = Symptoms noticeable and bothersome but not every day  Regurgitation when lying down? 2 = Symptoms noticeable and bothersome but not every day  Regurgitation when standing up? 0 = No symptoms  Regurgitation after meals? 2 = Symptoms noticeable and bothersome but not every day  Does regurgitation change your diet? 2 = Symptoms noticeable and bothersome but not every day  Does regurgitation wake you from sleep? 2 = Symptoms noticeable and bothersome but not every day    How satisfied are you with your present condition? Dissatisfied    Total score (calculated by summing the individual scores of questions 1-15): 14   Greatest possible score 75 (worst symptoms).   Lowest possible score 0 (no symptoms).    Heartburn score (calculated by summing the individual scores of questions 1-6): 0   Worst heartburn symptoms: 30.   No heartburn symptoms: 0.   Score less than or equal to 12 with each individual question not exceeding 2 indicate heartburn elimination.    Regurgitation score (calculated by summing the individual scores of questions 10-15): 10   Worst regurgitation symptoms: 30.   No regurgitation symptoms: 0.   Score less than or equal to 12 with each individual question not exceeding 2 indicate regurgitation elimination.    CURRENT MEDICATIONS:  Current Outpatient Medications   Medication Sig Dispense Refill    adapalene-benzoyl peroxide 0.1-2.5 % gel APPLY THIN LAYER TO FACE NIGHTLY      fluoride, " sodium, (Clinpro 5000) 1.1 % dental paste Apply to teeth once daily.      fluticasone (Flonase Allergy Relief) 50 mcg/actuation nasal spray Administer into affected nostril(s).      norgestimate-ethinyl estradioL (Sprintec, 28,) 0.25-35 mg-mcg tablet Take 1 tablet by mouth once daily. 84 tablet 3    omeprazole (PriLOSEC) 40 mg DR capsule Take 1 capsule (40 mg) by mouth once daily in the morning. Take before meals. 90 capsule 3     No current facility-administered medications for this visit.       PAST MEDICAL HISTORY:    Past Medical History:   Diagnosis Date    Other conditions influencing health status     No significant past medical history        PAST SURGICAL HISTORY:  Past Surgical History:   Procedure Laterality Date    OTHER SURGICAL HISTORY  08/20/2018    Extraction Impacted Root       FAMILY HISTORY:    No family history on file.     SOCIAL HISTORY:    Social History     Tobacco Use    Smoking status: Never    Smokeless tobacco: Never       ALLERGIES:    No Known Allergies    REVIEW OF SYSTEMS:  GENERAL: Negative for malaise, significant weight loss and fever  NECK: Negative for lumps, goiter, pain and significant neck swelling  RESPIRATORY: Negative for cough, wheezing or shortness of breath.  CARDIOVASCULAR: Negative for chest pain, leg swelling or palpitations.  GI: Negative for abdominal discomfort, blood in stools or black stools or change in bowel habits  : No history of dysuria, frequency or incontinence  MUSCULOSKELETAL: Negative for joint pain or swelling, back pain or muscle pain.  SKIN: Negative for lesions, rash, and itching.  PSYCH: Negative for sleep disturbance, mood disorder and recent psychosocial stressors.  ENDOCRINE: Negative for cold or heat intolerance, polyuria, polydipsia and goiter.    PHYSICAL EXAM  Visit Vitals  /71   Pulse 71     General appearance: obese  Skin: warm, no erythema or rashes  Lungs: clear to percussion and auscultation  Heart: regular rhythm and S1, S2  normal  Abdomen: soft, nt/ completely benign.  No lizama's.   Extremities: Normal exam of the extremities. No swelling or pain.    IMPRESSION:  Sangeeta Padron is a 19 y.o. female with reflux controlled with PPI and abdominal pain with diarrhea. US showed no pathology, gallbladder without stones. Food diary revealed worsening symptoms with meat, dairy products.  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 . She has had workup with some tests at [employer] and some at . Endoscopy shows normal anatomy. Esophagram shows abnormal motility, manometry demonstrates a sliding hiatal hernia with a hypotensive LES and poor esophageal clearance. She is here with her parents today. She does have pathologic reflux on the bravo with reflux 5/3%. She has a small sliding hiatal hernia. We discussed she would be a great candidate for TIF and geve a 70% success rate. We discussed with surgery can address the HH and a fundoplication to strengthen the hypotensive valve. she notes her symptoms are manageable for the moment. We discussed the poor symtpoms association on the bravo and that some symtpoms are related to reflux and some may be related to her menses. Now 6 months later she notes the following:cough about the same. Notes more hiccups when she eat. Abdominal pain less often and now notes the menses seem like a separate thing. We will continue to monitor and see her in 6 months.      Today she confirms that she still has some pain and some heartburn.  However she does confirm that they are not related to her menstrual cycle.  The PPIs do reasonably control her reflux but she does have significant breakthrough symptoms.  Is interesting is that she did talk more about some lower abdominal pain and her diarrhea today.  It is not necessarily related to food.  But this seems to be separate from recurrent heartburn.  Plan for mom to keep a food diary and see if  she can associate some of the lower abdominal pain and the diarrhea with different types of foods.  As we discussed that if we wanted to do an antireflux procedure by deciliter abdominal pain and diarrhea it is unlikely to be affected by that.  Will also evaluate her gallbladder with right upper quadrant ultrasound as they can be a cause of some of her lower abdominal symptoms.  We will see her back in 4 months time at which point we will reevaluate.  At that point we will likely discuss intervention for her reflux and hopefully have a better understanding of her diarrhea.  The patient and her mother are comfortable with this plan    She notes reflux is worse but more manageable with the wedge.  She would like to hold off on any intervention.  We will continue PPI and add pepcid for the nighttime symtpoms.   Also monitor the other diarrhea symtpoms as the GB seems normal.  HIDA can be done if symtpoms increase.   We will try to manage medically for as long as possbile.      PLAN:  We will renew the omerpazole.  Take in morning before breakfast  Take a pepcid half hour before dinner.  Follow up in 6 months  Avoid the dairy and fatty foods that make the diarrhea worse.    We can test gallbladder function down the line if needed.       Dr. Angelika Kelley M.D., MPH  Director of Bariatric & Minimally Invasive Surgery  [place] [address]  T:  [phone]  F:  [phone]     Nila Hopper, RN Assistant Nurse Manager, Care Coordinator - Bariatric/General Surgery [facility] Patient Nursing Contact  T:  [phone]  F:  [phone]

## 2024-07-19 ENCOUNTER — TELEPHONE (OUTPATIENT)
Dept: SURGERY | Facility: CLINIC | Age: 19
End: 2024-07-19
Payer: COMMERCIAL

## 2024-07-19 NOTE — TELEPHONE ENCOUNTER
Thank you for speaking with me earlier today & confirming your scheduled visit with Dr. Kelley on 7/22/24 2:15 PM at [facility] [address] ([address]) [place] [address] (inside [facility], main floor in the [facility]ty [facility]).   Parking is available at a cost of $5.00 at the Main Entrance.   Please note our clinic exam rooms are air conditioned so you may want to bring a jacket.  We look forward to seeing you, Brandy Reyna LPN Clinic Nurse.

## 2024-07-22 ENCOUNTER — APPOINTMENT (OUTPATIENT)
Dept: SURGERY | Facility: CLINIC | Age: 19
End: 2024-07-22
Payer: COMMERCIAL

## 2024-07-22 VITALS
DIASTOLIC BLOOD PRESSURE: 71 MMHG | SYSTOLIC BLOOD PRESSURE: 105 MMHG | WEIGHT: 137 LBS | HEART RATE: 71 BPM | BODY MASS INDEX: 24.27 KG/M2 | HEIGHT: 63 IN

## 2024-07-22 DIAGNOSIS — K21.9 GASTROESOPHAGEAL REFLUX DISEASE WITHOUT ESOPHAGITIS: Primary | ICD-10-CM

## 2024-07-22 PROCEDURE — 99214 OFFICE O/P EST MOD 30 MIN: CPT | Performed by: SURGERY

## 2024-07-22 PROCEDURE — 3008F BODY MASS INDEX DOCD: CPT | Performed by: SURGERY

## 2024-07-22 RX ORDER — OMEPRAZOLE 40 MG/1
40 CAPSULE, DELAYED RELEASE ORAL DAILY
Qty: 30 CAPSULE | Refills: 5 | Status: CANCELLED | OUTPATIENT
Start: 2024-07-22 | End: 2024-08-21

## 2024-07-22 RX ORDER — FAMOTIDINE 40 MG/1
20 TABLET, FILM COATED ORAL DAILY
Qty: 30 TABLET | Refills: 5 | Status: CANCELLED | OUTPATIENT
Start: 2024-07-22 | End: 2024-08-21

## 2024-07-24 RX ORDER — FAMOTIDINE 20 MG/1
20 TABLET, FILM COATED ORAL DAILY
Qty: 30 TABLET | Refills: 5 | Status: SHIPPED | OUTPATIENT
Start: 2024-07-24 | End: 2025-01-20

## 2024-07-24 RX ORDER — OMEPRAZOLE 40 MG/1
40 CAPSULE, DELAYED RELEASE ORAL DAILY
Qty: 30 CAPSULE | Refills: 5 | Status: SHIPPED | OUTPATIENT
Start: 2024-07-24 | End: 2025-01-20

## 2024-07-24 NOTE — PATIENT INSTRUCTIONS
PLAN:  We will renew the omerpazole.  Take in morning before breakfast  Take a pepcid half hour before dinner.  Follow up in 6 months  Avoid the dairy and fatty foods that make the diarrhea worse.    We can test gallbladder function down the line if needed.       Dr. Angelika Kelley M.D., MPH  Director of Bariatric & Minimally Invasive Surgery  [place] [address]  T:  [phone]  F:  [phone]     Nila Hopper, RN Assistant Nurse Manager, Care Coordinator - Bariatric/General Surgery [facility] Patient Nursing Contact  T:  [phone]  F:  [phone]

## 2024-07-26 ENCOUNTER — LAB (OUTPATIENT)
Dept: LAB | Facility: LAB | Age: 19
End: 2024-07-26
Payer: COMMERCIAL

## 2024-07-26 DIAGNOSIS — E55.9 VITAMIN D DEFICIENCY: ICD-10-CM

## 2024-07-26 DIAGNOSIS — Z00.00 ROUTINE GENERAL MEDICAL EXAMINATION AT A HEALTH CARE FACILITY: ICD-10-CM

## 2024-07-26 LAB
25(OH)D3 SERPL-MCNC: 30 NG/ML (ref 30–100)
ALBUMIN SERPL BCP-MCNC: 4.5 G/DL (ref 3.4–5)
ALP SERPL-CCNC: 54 U/L (ref 33–110)
ALT SERPL W P-5'-P-CCNC: 9 U/L (ref 7–45)
ANION GAP SERPL CALC-SCNC: 16 MMOL/L (ref 10–20)
AST SERPL W P-5'-P-CCNC: 15 U/L (ref 9–39)
BILIRUB SERPL-MCNC: 0.7 MG/DL (ref 0–1.2)
BUN SERPL-MCNC: 11 MG/DL (ref 6–23)
CALCIUM SERPL-MCNC: 9.4 MG/DL (ref 8.6–10.6)
CHLORIDE SERPL-SCNC: 103 MMOL/L (ref 98–107)
CHOLEST SERPL-MCNC: 242 MG/DL (ref 0–199)
CHOLESTEROL/HDL RATIO: 3.9
CO2 SERPL-SCNC: 25 MMOL/L (ref 21–32)
CREAT SERPL-MCNC: 0.82 MG/DL (ref 0.5–1.05)
EGFRCR SERPLBLD CKD-EPI 2021: >90 ML/MIN/1.73M*2
ERYTHROCYTE [DISTWIDTH] IN BLOOD BY AUTOMATED COUNT: 15.7 % (ref 11.5–14.5)
EST. AVERAGE GLUCOSE BLD GHB EST-MCNC: 108 MG/DL
GLUCOSE SERPL-MCNC: 87 MG/DL (ref 74–99)
HBA1C MFR BLD: 5.4 %
HCT VFR BLD AUTO: 39.7 % (ref 36–46)
HDLC SERPL-MCNC: 61.7 MG/DL
HGB BLD-MCNC: 12.4 G/DL (ref 12–16)
LDLC SERPL CALC-MCNC: 146 MG/DL
MCH RBC QN AUTO: 24.9 PG (ref 26–34)
MCHC RBC AUTO-ENTMCNC: 31.2 G/DL (ref 32–36)
MCV RBC AUTO: 80 FL (ref 80–100)
NON HDL CHOLESTEROL: 180 MG/DL (ref 0–149)
NRBC BLD-RTO: 0 /100 WBCS (ref 0–0)
PLATELET # BLD AUTO: 324 X10*3/UL (ref 150–450)
POTASSIUM SERPL-SCNC: 4.8 MMOL/L (ref 3.5–5.3)
PROT SERPL-MCNC: 7.4 G/DL (ref 6.4–8.2)
RBC # BLD AUTO: 4.98 X10*6/UL (ref 4–5.2)
SODIUM SERPL-SCNC: 139 MMOL/L (ref 136–145)
TRIGL SERPL-MCNC: 171 MG/DL (ref 0–149)
TSH SERPL-ACNC: 2.27 MIU/L (ref 0.44–3.98)
VIT B12 SERPL-MCNC: 279 PG/ML (ref 211–911)
VLDL: 34 MG/DL (ref 0–40)
WBC # BLD AUTO: 6.3 X10*3/UL (ref 4.4–11.3)

## 2024-07-26 PROCEDURE — 83036 HEMOGLOBIN GLYCOSYLATED A1C: CPT

## 2024-07-26 PROCEDURE — 84443 ASSAY THYROID STIM HORMONE: CPT

## 2024-07-26 PROCEDURE — 36415 COLL VENOUS BLD VENIPUNCTURE: CPT

## 2024-07-26 PROCEDURE — 82306 VITAMIN D 25 HYDROXY: CPT

## 2024-07-26 PROCEDURE — 80053 COMPREHEN METABOLIC PANEL: CPT

## 2024-07-26 PROCEDURE — 82607 VITAMIN B-12: CPT

## 2024-07-26 PROCEDURE — 80061 LIPID PANEL: CPT

## 2024-07-26 PROCEDURE — 85027 COMPLETE CBC AUTOMATED: CPT

## 2024-08-09 DIAGNOSIS — Z00.00 ROUTINE GENERAL MEDICAL EXAMINATION AT A HEALTH CARE FACILITY: Primary | ICD-10-CM

## 2024-12-20 ENCOUNTER — TELEPHONE (OUTPATIENT)
Dept: SURGERY | Facility: CLINIC | Age: 19
End: 2024-12-20
Payer: COMMERCIAL

## 2024-12-20 NOTE — TELEPHONE ENCOUNTER
Attempted to call the patient in regards to the appointment she has with Dr. Kelley in Jan. Due to Dr. Kelley being out we need to move her appointment. I LVM stating the above and to call back when she can.

## 2025-01-20 ENCOUNTER — APPOINTMENT (OUTPATIENT)
Dept: SURGERY | Facility: CLINIC | Age: 20
End: 2025-01-20
Payer: COMMERCIAL

## 2025-03-11 ENCOUNTER — TELEPHONE (OUTPATIENT)
Dept: PRIMARY CARE | Facility: CLINIC | Age: 20
End: 2025-03-11
Payer: COMMERCIAL

## 2025-03-11 DIAGNOSIS — E55.9 VITAMIN D DEFICIENCY: ICD-10-CM

## 2025-03-11 DIAGNOSIS — Z00.00 ROUTINE GENERAL MEDICAL EXAMINATION AT A HEALTH CARE FACILITY: Primary | ICD-10-CM

## 2025-03-12 NOTE — PROGRESS NOTES
"Subjective   Patient ID: Sangeeta Padron is a 19 y.o. female who presents for Follow-up (Dizziness) and Annual Exam.    Last Annual Physical: December 2023  Last Dental Visit: Last summer   Last Eye exam: Two months ago  Hearing Concerns: no  Diet: well-balanced  Exercise Routine: Not regular       HPI     The patient is on OCPs for acne, testosterone gel, iron, Flonase and is also taking Pepcid, omeprazole for GERD. She states her reflux symptoms have been worsening; however, not severe. She was found to have a small hiatal on her scope.      Blood pressure is stable at this time.  Patient is doing well and will be graduating this year.  Denies any depression.    She has recently been prescribed iron supplements for dizziness and wanted to follow up on this. Reports dizziness has resolved since she started iron.       Review of Systems  Constitutional: No fever or chills, No Night Sweats  Eyes: No Blurry Vision or Eye sight problems  ENT: No Nasal Discharge, Hoarseness, + sore throat  Cardiovascular: no chest pain, no palpitations and no syncope.   Respiratory: no cough, no shortness of breath during exertion and no shortness of breath at rest.   Gastrointestinal: no abdominal pain, no nausea and no vomiting. + constipation  : No vaginal discharge, burning with urination, no blood in urine or stools  Skin: No Skin rashes or Lesions  Neuro: No Headache, +  dizziness or Numbness or tingling  Psych: No Anxiety, depression or sleeping problems  Heme: No Easy bleeding or brusing.     Objective   /73   Pulse 77   Ht 1.6 m (5' 3\")   Wt 68.9 kg (152 lb)   SpO2 98%   BMI 26.93 kg/m²     Physical Exam  Constitutional: Alert and in no acute distress. Well developed, well nourished.   Head and Face: Head and face: Normal.    Eyes: Normal external exam. Pupils were equal in size, round, reactive to light (PERRL) with normal accommodation and extraocular movements intact (EOMI).   Ears, Nose, Mouth, and Throat: " External inspection of ears and nose: Normal.  Hearing: Normal.  Nasal mucosa, septum, and turbinates: Normal.  Lips, teeth, and gums: Normal.  Oropharynx: Normal.   Neck: No neck mass was observed. Supple. Thyroid not enlarged and there were no palpable thyroid nodules.   Cardiovascular: Heart rate and rhythm were normal, normal S1 and S2. Pedal pulses: Normal. No peripheral edema.   Pulmonary: No respiratory distress. Clear bilateral breath sounds.   Abdomen: Soft nontender; no abdominal mass palpated. Normal bowel sounds. No organomegaly.   : Deferred   Musculoskeletal: No joint swelling seen, normal movements of all extremities. Range of motion: Normal.  Muscle strength/tone: Normal.    Skin: Normal skin color and pigmentation, normal skin turgor, and no rash.   Neurologic: Deep tendon reflexes were 2+ and symmetric.   Psychiatric: Judgment and insight: Intact. Mood and affect: Normal.  Lymphatic: No cervical lymphadenopathy. Palpation of lymph nodes in axillae: Normal.  Palpation of lymph nodes in groin: Normal.    Lab Results   Component Value Date    WBC 6.3 07/26/2024    HGB 12.4 07/26/2024    HCT 39.7 07/26/2024     07/26/2024    CHOL 242 (H) 07/26/2024    TRIG 171 (H) 07/26/2024    HDL 61.7 07/26/2024    ALT 9 07/26/2024    AST 15 07/26/2024     07/26/2024    K 4.8 07/26/2024     07/26/2024    CREATININE 0.82 07/26/2024    BUN 11 07/26/2024    CO2 25 07/26/2024    TSH 2.27 07/26/2024    HGBA1C 5.4 07/26/2024       US gallbladder  Narrative: Interpreted By:  Mack Edwards,   STUDY:  US GALLBLADDER;  5/2/2024 9:19 am      INDICATION:  Signs/Symptoms:Rule out Gallstones as source of reported RUQ ABD  pain/diarrhea & After MD Exam..      COMPARISON:  None.      ACCESSION NUMBER(S):  [id number]      ORDERING CLINICIAN:  IVETH TEJADA      TECHNIQUE:  Multiple images of the right upper quadrant were obtained.      FINDINGS:  LIVER:  The liver measures 14.8 cm and is grossly unremarkable  and free of  any focal lesions.          GALLBLADDER:  The gallbladder is nondistended, and demonstrates no evidence of  gallstones, wall thickening or surrounding fluid. The gallbladder  wall thickness is .16cm. Sonographic Alonso's sign is negative.          BILE DUCTS:  No evidence of intra or extrahepatic biliary dilatation is  identified; the common bile duct measures .16 cm.      PANCREAS:  The visualized pancreas is unremarkable in appearance.      RIGHT KIDNEY:  The right kidney measures 10.1 cm in length. The renal cortical  echogenicity and thickness are within normal limit.  No  hydronephrosis or renal calculi are seen.      Impression: Unremarkable ultrasound of the right upper quadrant.      MACRO:  None      Signed by: Mack Edwards 5/3/2024 6:56 AM  Dictation workstation:   [id number]      Assessment/Plan   Diagnoses and all orders for this visit:  Annual physical exam  Gastroesophageal reflux disease without esophagitis  -     famotidine (Pepcid) 20 mg tablet; Take 1 tablet (20 mg) by mouth once daily at bedtime.  -     omeprazole (PriLOSEC) 40 mg DR capsule; Take 1 capsule (40 mg) by mouth once daily in the morning. Take before meals. Open Capsule, sprinkle over sugar free applesauce or pudding - Do not crush or chew.  PMS (premenstrual syndrome)  -     norgestimate-ethinyl estradiol (Sprintec, 28,) 0.25-35 mg-mcg tablet; Take 1 tablet by mouth once daily.  Hiatal hernia  Encounter for immunization  -     Tdap vaccine, age 7 years and older  (BOOSTRIX)        Dear Sangeeta Padron     It was my pleasure to take care of you today in the office. Below are the things we discussed today:    1. 1. Immunizations: Yearly Flu shot is recommended. Up-to-Date         a: COVID: Booster Up-to-Date         b: Tetanus: Taken today   C. HPV: Up-to-date    2. Blood Work: Ordered   3. Seen your dentist twice a year  4. Yearly Eye exam is recommended    5. BMI: Overweight   6: Diet recommendations:   Eat Clean,  Try to have as many home cooked meals as possible  Avoid processed foods which contain excess calories, sugar, and sodium.    7. Exercise recommendations:   150 minutes a week to maintain your weight     If you have to lose weight, you need a better diet and exercise plan.     8. PAP - Not indicated as patient is not sexually active.     9. Acne: The patient is on OCPs.     10. GERD: Continue Pepcid and omeprazole.  The patient will cut down on omeprazole and see if her symptoms are better. Encouraged to eat mindfully and avoid spicy, citrus foods. She was found to have a hiatal hernia on scope.     11. Continue Iron Supplements, recheck levels    Follow up in one year for a Physical. Please call the office before your Physical to see if you need blood work completed prior to your physical.     Please call me if any questions arise from now until your next visit. I will call you after I am done seeing patients. A Doctor is always available by phone when the office is closed. Please feel free to call for help with any problem that you feel shouldn't wait until the office re-opens.     Scribe Attestation  By signing my name below, INgozi Scribe   attest that this documentation has been prepared under the direction and in the presence of Toya Ryan MD.

## 2025-03-13 ENCOUNTER — APPOINTMENT (OUTPATIENT)
Dept: PRIMARY CARE | Facility: CLINIC | Age: 20
End: 2025-03-13
Payer: COMMERCIAL

## 2025-03-13 VITALS
DIASTOLIC BLOOD PRESSURE: 73 MMHG | SYSTOLIC BLOOD PRESSURE: 112 MMHG | HEART RATE: 77 BPM | HEIGHT: 63 IN | BODY MASS INDEX: 26.93 KG/M2 | OXYGEN SATURATION: 98 % | WEIGHT: 152 LBS

## 2025-03-13 DIAGNOSIS — K21.9 GASTROESOPHAGEAL REFLUX DISEASE WITHOUT ESOPHAGITIS: ICD-10-CM

## 2025-03-13 DIAGNOSIS — K44.9 HIATAL HERNIA: ICD-10-CM

## 2025-03-13 DIAGNOSIS — Z23 ENCOUNTER FOR IMMUNIZATION: ICD-10-CM

## 2025-03-13 DIAGNOSIS — N94.3 PMS (PREMENSTRUAL SYNDROME): ICD-10-CM

## 2025-03-13 DIAGNOSIS — Z00.00 ANNUAL PHYSICAL EXAM: Primary | ICD-10-CM

## 2025-03-13 PROCEDURE — 3008F BODY MASS INDEX DOCD: CPT | Performed by: FAMILY MEDICINE

## 2025-03-13 PROCEDURE — 90471 IMMUNIZATION ADMIN: CPT | Performed by: FAMILY MEDICINE

## 2025-03-13 PROCEDURE — 99395 PREV VISIT EST AGE 18-39: CPT | Performed by: FAMILY MEDICINE

## 2025-03-13 PROCEDURE — 1036F TOBACCO NON-USER: CPT | Performed by: FAMILY MEDICINE

## 2025-03-13 PROCEDURE — 90715 TDAP VACCINE 7 YRS/> IM: CPT | Performed by: FAMILY MEDICINE

## 2025-03-13 RX ORDER — NORGESTIMATE AND ETHINYL ESTRADIOL 0.25-0.035
1 KIT ORAL DAILY
Qty: 84 TABLET | Refills: 3 | Status: SHIPPED | OUTPATIENT
Start: 2025-03-13 | End: 2026-03-13

## 2025-03-13 RX ORDER — FERROUS SULFATE 325(65) MG
325 TABLET ORAL
COMMUNITY
Start: 2025-01-29

## 2025-03-13 RX ORDER — FAMOTIDINE 20 MG/1
20 TABLET, FILM COATED ORAL NIGHTLY
Qty: 90 TABLET | Refills: 3 | Status: SHIPPED | OUTPATIENT
Start: 2025-03-13

## 2025-03-13 RX ORDER — OMEPRAZOLE 40 MG/1
40 CAPSULE, DELAYED RELEASE ORAL
Qty: 90 CAPSULE | Refills: 3 | Status: SHIPPED | OUTPATIENT
Start: 2025-03-13

## 2025-03-13 ASSESSMENT — COLUMBIA-SUICIDE SEVERITY RATING SCALE - C-SSRS
1. IN THE PAST MONTH, HAVE YOU WISHED YOU WERE DEAD OR WISHED YOU COULD GO TO SLEEP AND NOT WAKE UP?: NO
2. HAVE YOU ACTUALLY HAD ANY THOUGHTS OF KILLING YOURSELF?: NO

## 2025-03-14 LAB
25(OH)D3+25(OH)D2 SERPL-MCNC: 27 NG/ML (ref 30–100)
CHOLEST SERPL-MCNC: 230 MG/DL
CHOLEST/HDLC SERPL: 3.6 (CALC)
EST. AVERAGE GLUCOSE BLD GHB EST-MCNC: 103 MG/DL
EST. AVERAGE GLUCOSE BLD GHB EST-SCNC: 5.7 MMOL/L
HBA1C MFR BLD: 5.2 % OF TOTAL HGB
HDLC SERPL-MCNC: 64 MG/DL
IRON SATN MFR SERPL: 9 % (CALC) (ref 16–45)
IRON SERPL-MCNC: 40 MCG/DL (ref 40–190)
LDLC SERPL CALC-MCNC: 134 MG/DL (CALC)
NONHDLC SERPL-MCNC: 166 MG/DL (CALC)
T4 FREE SERPL-MCNC: 1.2 NG/DL (ref 0.8–1.8)
TIBC SERPL-MCNC: 466 MCG/DL (CALC) (ref 250–450)
TRIGL SERPL-MCNC: 187 MG/DL
TSH SERPL-ACNC: 13.39 MIU/L
VIT B12 SERPL-MCNC: 254 PG/ML (ref 200–1100)

## 2025-05-12 ENCOUNTER — APPOINTMENT (OUTPATIENT)
Dept: SURGERY | Facility: CLINIC | Age: 20
End: 2025-05-12
Payer: COMMERCIAL

## 2025-05-12 ENCOUNTER — PATIENT MESSAGE (OUTPATIENT)
Dept: PRIMARY CARE | Facility: CLINIC | Age: 20
End: 2025-05-12

## 2025-05-12 DIAGNOSIS — K21.9 GASTROESOPHAGEAL REFLUX DISEASE WITHOUT ESOPHAGITIS: Primary | ICD-10-CM

## 2025-05-12 RX ORDER — FERROUS SULFATE 325(65) MG
325 TABLET ORAL
Qty: 45 TABLET | Refills: 3 | Status: SHIPPED | OUTPATIENT
Start: 2025-05-12

## 2025-05-21 ENCOUNTER — APPOINTMENT (OUTPATIENT)
Dept: SURGERY | Facility: CLINIC | Age: 20
End: 2025-05-21
Payer: COMMERCIAL

## 2025-05-21 VITALS
DIASTOLIC BLOOD PRESSURE: 73 MMHG | HEART RATE: 74 BPM | HEIGHT: 63 IN | BODY MASS INDEX: 26.75 KG/M2 | SYSTOLIC BLOOD PRESSURE: 121 MMHG | WEIGHT: 151 LBS | TEMPERATURE: 97.9 F

## 2025-05-21 DIAGNOSIS — K21.9 GASTROESOPHAGEAL REFLUX DISEASE, UNSPECIFIED WHETHER ESOPHAGITIS PRESENT: Primary | ICD-10-CM

## 2025-05-21 DIAGNOSIS — K44.9 HIATAL HERNIA: ICD-10-CM

## 2025-05-21 PROCEDURE — 99214 OFFICE O/P EST MOD 30 MIN: CPT

## 2025-05-21 PROCEDURE — 3008F BODY MASS INDEX DOCD: CPT

## 2025-05-21 RX ORDER — ADAPALENE AND BENZOYL PEROXIDE GEL, 0.1%/2.5% 1; 25 MG/G; MG/G
GEL TOPICAL
COMMUNITY
End: 2025-05-22 | Stop reason: WASHOUT

## 2025-05-21 ASSESSMENT — PAIN SCALES - GENERAL: PAINLEVEL_OUTOF10: 0-NO PAIN

## 2025-05-21 NOTE — PROGRESS NOTES
GENERAL SURGERY  POST DIAGNOTIC TESTING FOLLOW UP VISIT    CLINIC DATE:  7/22/24    NAME: Sangeeta Padron 19 y.o.  MRN: [id number]    THIS VISIT WEIGHT / BMI: 137 LBS/ BMI: 24.27  Wt Readings from Last 1 Encounters:   05/21/25 68.5 kg (151 lb)      BMI Readings from Last 1 Encounters:   05/21/25 26.75 kg/m²     WEIGHT / BMI TODAY:    Wt Readings from Last 3 Encounters:   05/21/25 68.5 kg (151 lb)   03/13/25 68.9 kg (152 lb)   07/22/24 62.1 kg (137 lb)      BMI Readings from Last 3 Encounters:   05/21/25 26.75 kg/m²   03/13/25 26.93 kg/m²   07/22/24 24.27 kg/m²       SURGEON:  Dr. YUMIKO Kelley     7/22:   Still taking omeprazole and it has helped. She kept a food diary. It helped some of her diarrhea and stomach aches especially when she avoids dairy (not milk, just cheese, icecream etc.) and fatty foods including meat. The cough stayed the same since the PPI. She did notice her reflux worsen at night and her throat hurting in the morning. So she is sleeping on wedge. she feels like her reflux is getting worse. Nevertheless she still feels her symptoms are manageable.  Nots reflux getting worse when she is sleeping.   Still on the ppi in am.  We have not tried a pepcid in the evening.  Been using the wedge and this is helpful. She notes overall feeling better as she is learning how to manage things.     PRESENTING FOR General Surgery Post Diagnostic Testing Results Review FUV: is a 19 y.o. female who has completed previously ordered RUQ US.      TEST RESULTS:    US gallbladder  Status: Final result     PACS Images     Show images for US gallbladder  Signed by    Signed Time Phone Pager   Mack Edwards MD 5/03/2024 06:56 [phone]     Exam Information    Status Exam Begun Exam Ended   Final 5/02/2024 08:50 5/02/2024 09:19     Study Result    Narrative & Impression   Interpreted By:  Mack Edwards,   STUDY:  US GALLBLADDER;  5/2/2024 9:19 am      INDICATION:  Signs/Symptoms:Rule out Gallstones as source of  reported RUQ ABD  pain/diarrhea & After MD Exam..      COMPARISON:  None.      ACCESSION NUMBER(S):  [id number]      ORDERING CLINICIAN:  IVETH TEJADA      TECHNIQUE:  Multiple images of the right upper quadrant were obtained.      FINDINGS:  LIVER:  The liver measures 14.8 cm and is grossly unremarkable and free of  any focal lesions.          GALLBLADDER:  The gallbladder is nondistended, and demonstrates no evidence of  gallstones, wall thickening or surrounding fluid. The gallbladder  wall thickness is .16cm. Sonographic Alonso's sign is negative.          BILE DUCTS:  No evidence of intra or extrahepatic biliary dilatation is  identified; the common bile duct measures .16 cm.      PANCREAS:  The visualized pancreas is unremarkable in appearance.      RIGHT KIDNEY:  The right kidney measures 10.1 cm in length. The renal cortical  echogenicity and thickness are within normal limit.  No  hydronephrosis or renal calculi are seen.      IMPRESSION:  Unremarkable ultrasound of the right upper quadrant.      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  She has had workup with some tests at [employer] and some at . Endoscopy shows normal anatomy. Esophagram shows abnormal motility, manometry demonstrates a sliding hiatal hernia with a hypotensive LES and poor esophageal clearance. She is here with her parents today. She does have pathologic reflux on the bravo with reflux 5/3%. She has a small sliding hiatal hernia. We discussed she would be a great candidate for TIF and geve a 70% success rate. We discussed with surgery can address the HH and a fundoplication to strengthen the hypotensive valve. she notes  her symptoms are manageable for the moment. We discussed the poor symtpoms association on the bravo and that some symtpoms are related to reflux and some may be related to her menses. Now 6 months later she notes the following:cough about the same. Notes more hiccups when she eat. Abdominal pain less often and now notes the menses seem like a separate thing. We will continue to monitor and see her in 6 months.      Today she confirms that she still has some pain and some heartburn.  However she does confirm that they are not related to her menstrual cycle.  The PPIs do reasonably control her reflux but she does have significant breakthrough symptoms.  Is interesting is that she did talk more about some lower abdominal pain and her diarrhea today.  It is not necessarily related to food.  But this seems to be separate from recurrent heartburn.  Plan for mom to keep a food diary and see if she can associate some of the lower abdominal pain and the diarrhea with different types of foods.  As we discussed that if we wanted to do an antireflux procedure by deciliter abdominal pain and diarrhea it is unlikely to be affected by that.  Will also evaluate her gallbladder with right upper quadrant ultrasound as they can be a cause of some of her lower abdominal symptoms.  We will see her back in 4 months time at which point we will reevaluate.  At that point we will likely discuss intervention for her reflux and hopefully have a better understanding of her diarrhea.  The patient and her mother are comfortable with this plan     PLAN:  Keep a food diary  Monitor to see if artifical sweeteners, gluten or fatty foods affect your diarrhea and lower abdominal pain  Get an ultrasound of the gallbladder  See me in 4 months  Continue te antiacid meds    for now.   Avoid trigger foods  Do not oevereat.       GERD - Health Related Quality of Life Questionnaire (GERD- HRQL)  On PPI  quit Omeprazole about 2 months ago    How bad is the  "heartburn? 0 = No symptoms  Heartburn when lying down? 0 = No symptoms  Heartburn when standing up? 0 = No symptoms  Heartburn after meals? 0 = No symptoms  Does heartburn change your diet? 0 = No symptoms  Does heartburn wake you from sleep? 0 = No symptoms    Do you have difficulty swallowing? 2 - symptoms bothersome, but not every day, \"mostly with liquids\"  Do you have pain with swallowing? 0 = No symptoms  If you take medication, does this affect your daily life? 1 = Symptoms noticeable but not bothersome    How bad is the regurgitation? 2 = Symptoms noticeable and bothersome but not every day  Regurgitation when lying down? 2 = Symptoms noticeable and bothersome but not every day  Regurgitation when standing up? 0 = No symptoms  Regurgitation after meals? 2 = Symptoms noticeable and bothersome but not every day  Does regurgitation change your diet? 2 = Symptoms noticeable and bothersome but not every day  Does regurgitation wake you from sleep? 0 = No symptoms     How satisfied are you with your present condition? Neutral     Total score (calculated by summing the individual scores of questions 1-15): 11   Greatest possible score 75 (worst symptoms).   Lowest possible score 0 (no symptoms).    Heartburn score (calculated by summing the individual scores of questions 1-6): 0   Worst heartburn symptoms: 30.   No heartburn symptoms: 0.   Score less than or equal to 12 with each individual question not exceeding 2 indicate heartburn elimination.    Regurgitation score (calculated by summing the individual scores of questions 10-15): 8   Worst regurgitation symptoms: 30.   No regurgitation symptoms: 0.   Score less than or equal to 12 with each individual question not exceeding 2 indicate regurgitation elimination.    CURRENT MEDICATIONS:  Current Outpatient Medications   Medication Sig Dispense Refill    famotidine (Pepcid) 20 mg tablet Take 1 tablet (20 mg) by mouth once daily at bedtime. 90 tablet 3    ferrous " sulfate 325 mg (65 mg elemental) tablet Take 1 tablet (325 mg) by mouth every other day. 45 tablet 3    fluoride, sodium, (Clinpro 5000) 1.1 % dental paste Apply to teeth once daily.      fluticasone (Flonase Allergy Relief) 50 mcg/actuation nasal spray Administer into affected nostril(s).      norgestimate-ethinyl estradiol (Sprintec, 28,) 0.25-35 mg-mcg tablet Take 1 tablet by mouth once daily. 84 tablet 3    testosterone 20.25 mg/1.25 gram (1.62 %) gel in metered-dose pump Place 2.5 g on the skin once daily.       No current facility-administered medications for this visit.       PAST MEDICAL HISTORY:    Past Medical History:   Diagnosis Date    GERD (gastroesophageal reflux disease) 2019    Other conditions influencing health status     No significant past medical history        PAST SURGICAL HISTORY:  Past Surgical History:   Procedure Laterality Date    OTHER SURGICAL HISTORY  08/20/2018    Extraction Impacted Root       FAMILY HISTORY:    Family History   Problem Relation Name Age of Onset    Asthma Mother Gely         SOCIAL HISTORY:    Social History     Tobacco Use    Smoking status: Never    Smokeless tobacco: Never   Substance Use Topics    Alcohol use: Never    Drug use: Never       ALLERGIES:    No Known Allergies    REVIEW OF SYSTEMS:  GENERAL: Negative for malaise, significant weight loss and fever  NECK: Negative for lumps, goiter, pain and significant neck swelling  RESPIRATORY: Negative for cough, wheezing or shortness of breath.  CARDIOVASCULAR: Negative for chest pain, leg swelling or palpitations.  GI: Negative for abdominal discomfort, blood in stools or black stools or change in bowel habits  : No history of dysuria, frequency or incontinence  MUSCULOSKELETAL: Negative for joint pain or swelling, back pain or muscle pain.  SKIN: Negative for lesions, rash, and itching.  PSYCH: Negative for sleep disturbance, mood disorder and recent psychosocial stressors.  ENDOCRINE: Negative for cold or  heat intolerance, polyuria, polydipsia and goiter.    PHYSICAL EXAM  Visit Vitals  /73   Pulse 74   Temp 36.6 °C (97.9 °F) (Temporal)     General appearance:   Skin: warm, no erythema or rashes  Lungs: clear to percussion and auscultation  Heart: regular rhythm and S1, S2 normal  Abdomen: soft, nt/ completely benign.  No lizama's.   Extremities: Normal exam of the extremities. No swelling or pain.    IMPRESSION:  Sangeeta Padron is a 19 y.o. female with reflux controlled with PPI and abdominal pain with diarrhea. US showed no pathology, gallbladder without stones. Food diary revealed worsening symptoms with meat, dairy products.  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 . She has had workup with some tests at [employer] and some at . Endoscopy shows normal anatomy. Esophagram shows abnormal motility, manometry demonstrates a sliding hiatal hernia with a hypotensive LES and poor esophageal clearance. She is here with her parents today. She does have pathologic reflux on the bravo with reflux 5/3%. She has a small sliding hiatal hernia. We discussed she would be a great candidate for TIF and geve a 70% success rate. We discussed with surgery can address the HH and a fundoplication to strengthen the hypotensive valve. she notes her symptoms are manageable for the moment. We discussed the poor symtpoms association on the bravo and that some symtpoms are related to reflux and some may be related to her menses. Now 6 months later she notes the following:cough about the same. Notes more hiccups when she eat. Abdominal pain less often and now notes the menses seem like a separate thing. We will continue to monitor and see her in 6 months.      Today she confirms that she still has some pain and some heartburn.  However she does confirm that they are not related to her menstrual cycle.  The PPIs do reasonably control her reflux but she does have  significant breakthrough symptoms.  Is interesting is that she did talk more about some lower abdominal pain and her diarrhea today.  It is not necessarily related to food.  But this seems to be separate from recurrent heartburn.  Plan for mom to keep a food diary and see if she can associate some of the lower abdominal pain and the diarrhea with different types of foods.  As we discussed that if we wanted to do an antireflux procedure by deciliter abdominal pain and diarrhea it is unlikely to be affected by that.  Will also evaluate her gallbladder with right upper quadrant ultrasound as they can be a cause of some of her lower abdominal symptoms.  We will see her back in 4 months time at which point we will reevaluate.  At that point we will likely discuss intervention for her reflux and hopefully have a better understanding of her diarrhea.  The patient and her mother are comfortable with this plan    She notes reflux is worse but more manageable with the wedge.  She would like to hold off on any intervention.  We will continue PPI and add pepcid for the nighttime symtpoms.   Also monitor the other diarrhea symtpoms as the GB seems normal.  HIDA can be done if symtpoms increase.   We will try to manage medically for as long as possbile.        05/21/25   Patient states that she has been managing her symptoms with meds: Pepcid and Omeprazole at first. It was working great. About 2 months ago she has stopped taking her Omeprazole d/t her PCP recommendation (in case Omeprazole interfered with Iron absorption). She still takes her Pepcid daily, which controls her symptoms. Her throat is not hurting in mornings and throughout a day, and she reports having minor cough. She rates her current condition as Neutral, because she is having symptoms, but they do not interfere with her lifestyle.         She has also gone to her dentist, and her teeth are looking good, especially with their protective tooth paste.  At this  time she would like to continue medical management.     PLAN:  Continue with current management (Pepcid) daily. If symptoms are not controlled, go back to Omeprazole in morning before breakfast and take Pepcid half hour before dinner for breakthrough regurgitation/hb.  Follow up in 6 -12 months  Avoid the dairy and fatty foods that make the diarrhea worse.      Yashira Lewis, APRN-CNP

## 2025-05-22 ENCOUNTER — APPOINTMENT (OUTPATIENT)
Dept: SURGERY | Facility: CLINIC | Age: 20
End: 2025-05-22
Payer: COMMERCIAL

## 2025-08-19 ENCOUNTER — APPOINTMENT (OUTPATIENT)
Dept: PRIMARY CARE | Facility: CLINIC | Age: 20
End: 2025-08-19
Payer: COMMERCIAL

## 2025-08-19 VITALS
SYSTOLIC BLOOD PRESSURE: 110 MMHG | DIASTOLIC BLOOD PRESSURE: 74 MMHG | HEART RATE: 60 BPM | HEIGHT: 63 IN | WEIGHT: 148 LBS | BODY MASS INDEX: 26.22 KG/M2 | OXYGEN SATURATION: 98 %

## 2025-08-19 DIAGNOSIS — E55.9 VITAMIN D DEFICIENCY: Primary | ICD-10-CM

## 2025-08-19 DIAGNOSIS — K21.9 GASTROESOPHAGEAL REFLUX DISEASE WITHOUT ESOPHAGITIS: ICD-10-CM

## 2025-08-19 DIAGNOSIS — E78.2 MODERATE MIXED HYPERLIPIDEMIA NOT REQUIRING STATIN THERAPY: ICD-10-CM

## 2025-08-19 DIAGNOSIS — D64.9 ANEMIA, UNSPECIFIED TYPE: ICD-10-CM

## 2025-08-19 DIAGNOSIS — N94.3 PMS (PREMENSTRUAL SYNDROME): ICD-10-CM

## 2025-08-19 DIAGNOSIS — R94.6 ABNORMAL THYROID EXAM: ICD-10-CM

## 2025-08-19 PROCEDURE — 99213 OFFICE O/P EST LOW 20 MIN: CPT | Performed by: FAMILY MEDICINE

## 2025-08-19 PROCEDURE — 3008F BODY MASS INDEX DOCD: CPT | Performed by: FAMILY MEDICINE

## 2025-08-19 PROCEDURE — 1036F TOBACCO NON-USER: CPT | Performed by: FAMILY MEDICINE

## 2025-08-19 RX ORDER — FAMOTIDINE 20 MG/1
20 TABLET, FILM COATED ORAL NIGHTLY
Qty: 90 TABLET | Refills: 3 | Status: SHIPPED | OUTPATIENT
Start: 2025-08-19

## 2025-08-19 RX ORDER — OMEPRAZOLE 40 MG/1
40 CAPSULE, DELAYED RELEASE ORAL
Qty: 90 CAPSULE | Refills: 3 | Status: SHIPPED | OUTPATIENT
Start: 2025-08-19

## 2025-08-19 RX ORDER — NORGESTIMATE AND ETHINYL ESTRADIOL 0.25-0.035
1 KIT ORAL DAILY
Qty: 84 TABLET | Refills: 3 | Status: SHIPPED | OUTPATIENT
Start: 2025-08-19 | End: 2026-08-19

## 2025-08-21 LAB
25(OH)D3+25(OH)D2 SERPL-MCNC: 41 NG/ML (ref 30–100)
CHOLEST SERPL-MCNC: 278 MG/DL
CHOLEST/HDLC SERPL: 4.5 (CALC)
HDLC SERPL-MCNC: 62 MG/DL
IRON SATN MFR SERPL: 29 % (CALC) (ref 16–45)
IRON SERPL-MCNC: 118 MCG/DL (ref 40–190)
LDLC SERPL CALC-MCNC: 183 MG/DL (CALC)
NONHDLC SERPL-MCNC: 216 MG/DL (CALC)
TIBC SERPL-MCNC: 403 MCG/DL (CALC) (ref 250–450)
TRIGL SERPL-MCNC: 178 MG/DL
TSH SERPL-ACNC: 2.66 MIU/L